# Patient Record
Sex: FEMALE | Race: WHITE
[De-identification: names, ages, dates, MRNs, and addresses within clinical notes are randomized per-mention and may not be internally consistent; named-entity substitution may affect disease eponyms.]

---

## 2023-05-03 VITALS — SYSTOLIC BLOOD PRESSURE: 104 MMHG | DIASTOLIC BLOOD PRESSURE: 64 MMHG

## 2023-05-09 ENCOUNTER — HOSPITAL ENCOUNTER (OUTPATIENT)
Dept: HOSPITAL 46 - DS | Age: 28
Discharge: HOME | End: 2023-05-09
Attending: OBSTETRICS & GYNECOLOGY
Payer: COMMERCIAL

## 2023-05-09 VITALS — HEIGHT: 62 IN | WEIGHT: 124.25 LBS | BODY MASS INDEX: 22.87 KG/M2

## 2023-05-09 VITALS — SYSTOLIC BLOOD PRESSURE: 105 MMHG | DIASTOLIC BLOOD PRESSURE: 49 MMHG

## 2023-05-09 VITALS — DIASTOLIC BLOOD PRESSURE: 67 MMHG | SYSTOLIC BLOOD PRESSURE: 109 MMHG

## 2023-05-09 VITALS — SYSTOLIC BLOOD PRESSURE: 106 MMHG | DIASTOLIC BLOOD PRESSURE: 52 MMHG

## 2023-05-09 VITALS — SYSTOLIC BLOOD PRESSURE: 110 MMHG | DIASTOLIC BLOOD PRESSURE: 66 MMHG

## 2023-05-09 DIAGNOSIS — D27.0: ICD-10-CM

## 2023-05-09 DIAGNOSIS — Z3A.16: ICD-10-CM

## 2023-05-09 DIAGNOSIS — O99.891: Primary | ICD-10-CM

## 2023-05-09 PROCEDURE — 0UT04ZZ RESECTION OF RIGHT OVARY, PERCUTANEOUS ENDOSCOPIC APPROACH: ICD-10-PCS | Performed by: OBSTETRICS & GYNECOLOGY

## 2023-05-09 NOTE — OR
[facility]
                                    [address], [place]  [address]
_________________________________________________________________________________________
                                                                 Draft    
 
 
DATE OF OPERATION:
05/09/2023
 
SURGEON:
Danielle Rocha DO
 
PREOPERATIVE DIAGNOSES:
1. Large symptomatic right ovarian mass consistent with dermoid.
2. 16 weeks gestation.
 
POSTOPERATIVE DIAGNOSES:
1. Large symptomatic right ovarian mass consistent with dermoid.
2. 16 weeks gestation.
 
PROCEDURE PERFORMED:
Laparoscopic right oophorectomy.
 
ASSISTANT:
Dave Guerrier MD
 
ANESTHESIA:
General.
 
ESTIMATED BLOOD LOSS:
10 mL.
 
SPECIMEN:
Right ovary with ovarian cyst and cyst contents.
 
DRAINS:
Joya to gravity.
 
FINDINGS:
A 16-week uterus.  Normal fetal heart rate pre and postoperatively.  Large right ovarian
cyst consistent with dermoid.  Normal left ovary and normal fallopian tubes bilaterally.
 No endometriosis or other abnormalities of the pelvic peritoneum. 
 
COMPLICATIONS:
None.
 
INDICATIONS:
Ms. Suresh is a very pleasant 27-year-old female at approximately 16 weeks gestation.
 
                                                                                    
_________________________________________________________________________________________
PATIENT NAME:     XU SURESH               
MEDICAL RECORD #: [id number]            OPERATIVE REPORT              
          ACCT #: [id number]  
DATE OF BIRTH:   [birth year]            REPORT #: [id number]      
PHYSICIAN:        DANIELLE ROCHA (EDGARDO)             
PCP:              JANET OSEI MD           
REPORT IS CONFIDENTIAL AND NOT TO BE RELEASED WITHOUT AUTHORIZATION
 
 
                                  [facility]
                                    [address] [place] [address]  [address]
_________________________________________________________________________________________
                                                                 Draft    
 
 
 Large right ovarian cyst was noted at first OB ultrasound consistent with dermoid.  The
patient reports that cyst is symptomatic, causing occasional intense pain in the right
pelvis.  We reviewed expectant management versus surgical management in the 2nd
trimester and the patient desires surgical management with laparoscopic right
oophorectomy.  Risks, benefits, and alternatives were discussed in detail with the
patient.  The patient understands and wishes to proceed with the procedure. 
 
TECHNIQUE:
The patient was taken to the operating room.  A time-out was performed to confirm
correct patient, correct procedure.  General anesthesia was adequately established.  The
patient was prepped and draped in dorsal lithotomy position with her feet in Yellofin
stirrups.  ICPs were on running and no preoperative antibiotics were given.  The patient
did receive preoperative heparin per SCIP protocol.  A Joya catheter was inserted and
no uterine manipulator was placed due to the patient's pregnancy.  Surgeon's gloves were
changed.  Attention was turned to the abdomen.  A 10 mm incision was made starting at
the base of the umbilicus caudad.  The fascia was grasped, elevated, and entered sharply
with Metzenbaum scissors.  The peritoneum was entered bluntly and pneumoperitoneum was
established after placement of Castro trocar.  The patient was placed in Trendelenburg
position and the uterus and ovary were identified.  A 5 mm assist ports were placed in
the right and left lower quadrant under direct visualization without complication.  The
left ovary and bilateral tubes were normal.  The right ovary was grossly enlarged,
consistent with dermoid cyst.  The right infundibulopelvic ligament and the ureter were
identified at the pelvic brim.  The right utero-ovarian ligament was then fulgurated and
divided without difficulty.  The peritoneum was nicked just lateral to the
infundibulopelvic ligament and the infundibulopelvic ligament was then dissected.  The
right ureter was again identified, noted to be medial to the infundibulopelvic ligament
and LigaSure device was then used to fulgurate and then divided the infundibulopelvic
ligament.  Endoloop was then used to secure the cut end of the infundibulopelvic
ligament.  The right ovary and cyst were now free.  A large EndoCatch bag was then
placed through the umbilical port and the ovary was placed in the EndoCatch bag and
brought through the umbilical incision.  The cyst was too large to bring through the
umbilical incision and the cyst was then opened and drained contained in the EndoCatch
bag.  The ovary was then delivered along the cyst contents again in the contained
EndoCatch bag through the umbilical incision and sent to Pathology for further
evaluation.  No spillage of ovarian cyst contents was noted.  Instruments were changed
to ensure no contamination and the pelvis was irrigated, found to be hemostatic, again
no spillage of ovarian contents confirmed.  Excellent hemostasis was appreciated.
Pneumoperitoneum was reduced and trocars removed.  Umbilical fascia was reapproximated
using 0 Vicryl in a running nonlocked manner and the skin was reapproximated of all
three trocar sites with 4-0 Monocryl with excellent hemostasis and cosmesis.  The
patient was then taken to PACU in good and stable condition. Fetal heart tones
postoperatively were performed with reassuring fetal heart tones appreciated. 
 
                                                                                    
_________________________________________________________________________________________
PATIENT NAME:     XU SURESH               
MEDICAL RECORD #: [id number]            OPERATIVE REPORT              
          ACCT #: [id number]  
DATE OF BIRTH:   [birth year]            REPORT #: [id number]      
PHYSICIAN:        DANIELLE ROCHA) DO            
PCP:              JANET OSEI MD           
REPORT IS CONFIDENTIAL AND NOT TO BE RELEASED WITHOUT AUTHORIZATION
 
 
                                  [facility]
                                    [address] [place] [address]  [address]
_________________________________________________________________________________________
                                                                 Draft    
 
 
 
Sponge, needle, and instrument counts were correct x2 at the end of the procedure.  Dr. Guerrier was present and participated in all portions of the procedure. 
 
 
 
            ________________________________________
            Danielle Rocha DO 
 
 
JSTEPHANIE/ROBERTOL
Job #:  [id number]
DD:  05/25/2023 16:44:37
DT:  05/25/2023 21:55:11
 
 
Copies:                                
~
 
 
 
 
 
 
 
 
 
 
 
 
 
 
 
 
 
 
 
 
 
 
 
 
 
                                                                                    
_________________________________________________________________________________________
PATIENT NAME:     XU SURESH               
MEDICAL RECORD #: [id number]            OPERATIVE REPORT              
          ACCT #: [id number]  
DATE OF BIRTH:   [birth year]            REPORT #: [id number]      
PHYSICIAN:        DANIELLE ROCHA)             
PCP:              JANET OSEI MD           
REPORT IS CONFIDENTIAL AND NOT TO BE RELEASED WITHOUT AUTHORIZATION

## 2023-05-11 NOTE — PATH
[facility]
                                    [address] [place], [place]  [address]
_________________________________________________________________________________________
                                                                 Signed   
 
 
 
SPECIMEN(S): A RIGHT OVARY AND CYST
 
SPECIMEN SOURCE:
A. RIGHT OVARY AND CYST
 
CLINICAL HISTORY:
Symptomatic large dermoid cyst/complex adnexal mass.
 
FINAL PATHOLOGIC DIAGNOSIS:
Right ovary and cyst:
-  Mature cystic teratoma.
-  Negative for atypical features or evidence of malignancy.
 
COMMENT:
The corresponding ovarian cyst fluid aspiration cytology (VN-23-15, Negative 
for malignancy) is  reviewed. 
JVR:franko:C2NR
 
MICROSCOPIC EXAMINATION:
Histologic sections of all submitted blocks are examined by light microscopy.  
These findings, together with the gross examination, support the pathologic 
diagnosis. 
 
GROSS DESCRIPTION:
The specimen, labeled and designated "Candice, right ovary and cyst," is 
received in formalin and consists of a fragmented ovarian and cyst tissue that 
aggregate measure 6.5 x 4.5 x 2.2 cm. 
 from the cyst tissue is brown-tan hair.  Sectioning through the 
specimen reveals skin-like tissue with brown-tan hair. Representative sections 
are submitted in (A1-A4). 
JS  (under the direct supervision of a pathologist)
The Gross Description was prepared using a voice recognition system. The report 
was reviewed for accuracy; however, sound-alike word errors, addition and/or 
deletions may occur. If there is any 
question about this report, please contact Client Services.
 
PERFORMING LABORATORY:
The technical component was performed by [facility], [address] [place] [id number] (CLIA# [id number]).  Professional interpretation was 
performed by [facility], 
[address] [place] [phone] (CLIA#: [id number]).
 
                                                                                    
_________________________________________________________________________________________
PATIENT NAME:     XU SURESH               
MEDICAL RECORD #: [id number]            PATHOLOGY                     
          ACCT #: [id number]       ACCESSION #: [id number]     
DATE OF BIRTH:   [birth year]            REPORT #: [id number]       
PHYSICIAN:        [facility]              
PCP:              JANET OSEI MD           
REPORT IS CONFIDENTIAL AND NOT TO BE RELEASED WITHOUT AUTHORIZATION
 
 
                                  [address]  [address]
_________________________________________________________________________________________
                                                                 Signed   
 
 
 
Diagnostician:  Narinder Rico MD
Pathologist
Electronically Signed 05/11/2023
 
 
Copies:                                
~
 
 
 
 
 
 
 
 
 
 
 
 
 
 
 
 
 
 
 
 
 
 
 
 
 
 
 
 
 
 
 
 
 
 
 
                                                                                    
_________________________________________________________________________________________
PATIENT NAME:     XU SURESH               
MEDICAL RECORD #: [id number]            PATHOLOGY                     
          ACCT #: [id number]       ACCESSION #: [id number]     
DATE OF BIRTH:   [birth year]            REPORT #: [id number]       
PHYSICIAN:        [facility]              
PCP:              JANET OSEI MD           
REPORT IS CONFIDENTIAL AND NOT TO BE RELEASED WITHOUT AUTHORIZATION

## 2023-10-05 ENCOUNTER — HOSPITAL ENCOUNTER (INPATIENT)
Dept: HOSPITAL 46 - FBCO | Age: 28
LOS: 2 days | Discharge: HOME | End: 2023-10-07
Attending: OBSTETRICS & GYNECOLOGY | Admitting: OBSTETRICS & GYNECOLOGY
Payer: COMMERCIAL

## 2023-10-05 DIAGNOSIS — Z3A.38: ICD-10-CM

## 2023-10-05 DIAGNOSIS — Z87.891: ICD-10-CM

## 2023-10-05 DIAGNOSIS — Z20.822: ICD-10-CM

## 2023-10-05 DIAGNOSIS — Z90.721: ICD-10-CM

## 2023-10-05 PROCEDURE — U0002 COVID-19 LAB TEST NON-CDC: HCPCS

## 2023-10-05 PROCEDURE — C9803 HOPD COVID-19 SPEC COLLECT: HCPCS

## 2023-10-05 PROCEDURE — A9270 NON-COVERED ITEM OR SERVICE: HCPCS

## 2023-10-06 VITALS — SYSTOLIC BLOOD PRESSURE: 123 MMHG | DIASTOLIC BLOOD PRESSURE: 92 MMHG

## 2023-10-06 LAB
ABO GROUP BLD: (no result)
COCAINE, UR: NEGATIVE
DEPRECATED RDW RBC AUTO: 12.8 FL (ref 10.5–15)
DEPRECATED RDW RBC AUTO: 13.3 FL (ref 10.5–15)
FLUBV RNA RESP QL NAA+PROBE: NEGATIVE
HCT VFR BLD AUTO: 37.1 % (ref 35–50)
HCT VFR BLD AUTO: 38.2 % (ref 35–50)
HGB BLD-MCNC: 12.9 G/DL (ref 12–18)
HGB BLD-MCNC: 13.3 G/DL (ref 12–18)
IAT POLY-SP REAG SERPL QL: NEGATIVE
MCH RBC QN AUTO: 32.1 PG (ref 27–36)
MCH RBC QN AUTO: 32.4 PG (ref 27–36)
MCHC RBC AUTO-ENTMCNC: 34.8 G/DL (ref 30–36)
MCHC RBC AUTO-ENTMCNC: 34.9 G/DL (ref 30–36)
MCV RBC AUTO: 91.9 FL (ref 81–99)
MCV RBC AUTO: 93 FL (ref 81–99)
METHADONE, UR: NEGATIVE
PLATELET # BLD AUTO: 151 K/UL (ref 140–440)
PLATELET # BLD AUTO: 170 K/UL (ref 140–440)
RBC # BLD AUTO: 3.99 M/UL (ref 4.3–5.7)
RBC # BLD AUTO: 4.16 M/UL (ref 4.3–5.7)
RH BLD: POSITIVE
RSV RNA ISLT QL NAA+PROBE: NEGATIVE
TRANSF BAND NUM PATIENT: (no result)

## 2023-10-06 PROCEDURE — 0HQ9XZZ REPAIR PERINEUM SKIN, EXTERNAL APPROACH: ICD-10-PCS | Performed by: OBSTETRICS & GYNECOLOGY

## 2023-10-06 NOTE — NUR
MOM SITTING UP IN BED.  DAD ON COUCH.  GRANDMOTHER IN CHAIR HOLDING BABY.  ALL
DENY NEEDS AT THIS TIME.  CONSENSTED TO PRAYER.  PRAYED FOR GOOD BEGINNINGS
AND ONGOING BLESSING.

## 2023-10-07 NOTE — PR
[facility]
                                    [address], [place]  [address]
_________________________________________________________________________________________
                                                                 Signed   
 
 
===================================
PP Progress Notes
===================================
Datetime Report Generated by CPN: 10/07/2023 10:20
   
SUBJECTIVE:  [id number]
Nausea/Vomiting:  Denies
Flatus:  Yes
Bowel Movement:  Yes
Vital Signs:  [id number]
Vital Signs:  Reviewed; Within Normal Limits
POSTPARTUM EXAM:  Ongoing
Abdomen/Uterus:  Normal
Lochia:  Normal
Extremities:  Normal
Breastfeeding Progress:  Normal
IMPRESSION/PLAN/PROCEDURES:  [id number]
Impression:  Normal Postpartum Progression
Plan:  Discharge
Procedures:  None
Progress Notes:  S: 29 yo  s/p .  PPD #1.  Doing well.  Denies HA, CP, SOB,
   F/C, N/V, RUQ pain, changes in vision, vaginal discharge.  Tolerating regular diet,
   ambulating, voidin on her own, +BM, pain controlled.   Denies any concerns or
   complaints. 
   O:
   AFVSS
   Abd: Soft.  Non tender to palpation.  Fundus firm and below umbilicus. 
   Musc: CIFUENTES.  No C/C/E.
   A/P: Patient well.  Meeting all hospital milestones.  Will discharge home today. 
Signing Physician:  Margot Ceron MD
 
 
Copies:                                
~
 
 
 
 
 
 
 
 
 
*Electronically Signed*  10/07/23  1020  MARGOT CERON MD            
                                                                       
_________________________________________________________________________________________
PATIENT NAME:     XU SURESH              
MEDICAL RECORD #: [id number]                     PROGRESS NOTE                 
          ACCT #: [id number]  
DATE OF BIRTH:   [birth year]                                       
PHYSICIAN:   MARGOT CERON MD                   RPT #: [id number]
REPORT IS CONFIDENTIAL AND NOT TO BE RELEASED WITHOUT AUTHORIZATION